# Patient Record
Sex: MALE | Race: BLACK OR AFRICAN AMERICAN | NOT HISPANIC OR LATINO | Employment: FULL TIME | URBAN - METROPOLITAN AREA
[De-identification: names, ages, dates, MRNs, and addresses within clinical notes are randomized per-mention and may not be internally consistent; named-entity substitution may affect disease eponyms.]

---

## 2024-03-06 ENCOUNTER — APPOINTMENT (OUTPATIENT)
Dept: GENERAL RADIOLOGY | Facility: HOSPITAL | Age: 44
End: 2024-03-06
Payer: OTHER MISCELLANEOUS

## 2024-03-06 ENCOUNTER — HOSPITAL ENCOUNTER (EMERGENCY)
Facility: HOSPITAL | Age: 44
Discharge: HOME | End: 2024-03-06
Attending: EMERGENCY MEDICINE | Admitting: EMERGENCY MEDICINE
Payer: OTHER MISCELLANEOUS

## 2024-03-06 VITALS
TEMPERATURE: 97.4 F | RESPIRATION RATE: 18 BRPM | DIASTOLIC BLOOD PRESSURE: 108 MMHG | OXYGEN SATURATION: 97 % | SYSTOLIC BLOOD PRESSURE: 149 MMHG | BODY MASS INDEX: 26.55 KG/M2 | HEIGHT: 72 IN | HEART RATE: 53 BPM | WEIGHT: 196 LBS

## 2024-03-06 DIAGNOSIS — S29.012A STRAIN OF THORACIC BACK REGION: Primary | ICD-10-CM

## 2024-03-06 DIAGNOSIS — S46.912A STRAIN OF LEFT SHOULDER, INITIAL ENCOUNTER: ICD-10-CM

## 2024-03-06 PROCEDURE — 25010000002 KETOROLAC TROMETHAMINE PER 15 MG: Performed by: EMERGENCY MEDICINE

## 2024-03-06 PROCEDURE — 96372 THER/PROPH/DIAG INJ SC/IM: CPT

## 2024-03-06 PROCEDURE — 73030 X-RAY EXAM OF SHOULDER: CPT

## 2024-03-06 PROCEDURE — 99282 EMERGENCY DEPT VISIT SF MDM: CPT

## 2024-03-06 RX ORDER — LIDOCAINE 50 MG/G
1 PATCH TOPICAL EVERY 24 HOURS
Qty: 15 EACH | Refills: 0 | Status: SHIPPED | OUTPATIENT
Start: 2024-03-06

## 2024-03-06 RX ORDER — IBUPROFEN 800 MG/1
800 TABLET ORAL EVERY 8 HOURS PRN
Qty: 20 TABLET | Refills: 0 | Status: SHIPPED | OUTPATIENT
Start: 2024-03-06

## 2024-03-06 RX ORDER — KETOROLAC TROMETHAMINE 30 MG/ML
30 INJECTION, SOLUTION INTRAMUSCULAR; INTRAVENOUS ONCE
Status: COMPLETED | OUTPATIENT
Start: 2024-03-06 | End: 2024-03-06

## 2024-03-06 RX ADMIN — KETOROLAC TROMETHAMINE 30 MG: 30 INJECTION, SOLUTION INTRAMUSCULAR; INTRAVENOUS at 22:15

## 2024-03-06 NOTE — Clinical Note
[facility] EMERGENCY DEPARTMENT  [address] [facility] [address]  Phone: [phone]    Mikhail Yeh was seen and treated in our emergency department on 3/6/2024.  He may return to work on 03/07/2024.  Return evening shift of 3/7/2024       Thank you for choosing [facility].    Dayanara Villegas MD

## 2024-03-06 NOTE — Clinical Note
[facility] EMERGENCY DEPARTMENT  [address] [facility] [address]  Phone: [phone]    Mikhail Yeh was seen and treated in our emergency department on 3/6/2024.  He may return to work on 03/08/2024.         Thank you for choosing [facility].    Dayanara Villegas MD

## 2024-03-07 NOTE — ED PROVIDER NOTES
" EMERGENCY DEPARTMENT ENCOUNTER    Room Number:  18/18  PCP: Provider, No Known  Independent Historians: Patient    HPI:  Chief Complaint: had concerns including Back Pain.      A complete HPI/ROS/PMH/PSH/SH/FH are unobtainable due to: None    Chronic or social conditions impacting patient care (Social Determinants of Health): None      Context: Mikhail Yeh is a 43 y.o. male with a medical history of \"back issues who presents to the ED c/o acute left upper back pain.  Patient works night shift at [employer].  He has a floater symptoms different positions.  Monday night he did a job lifting and pulling for the entire shift that aggravated left upper back and left posterior shoulder and shoulder blade.  Since then he has had trouble sleeping due to pain and felt so tight today that he could not go to work.  Patient has never had any prior procedures or surgeries on the left shoulder.  He denies any specific injury, fall, or popping or snapping sensation.        Review of prior external notes (non-ED) -and- Review of prior external test results outside of this encounter: Patient was seen for headache and fatigue in office with primary care provider in May 2023.  Patient had some basic labs done at that time including a BMP that was normal.    Prescription drug monitoring program review:     N/A    PAST MEDICAL HISTORY  Active Ambulatory Problems     Diagnosis Date Noted    No Active Ambulatory Problems     Resolved Ambulatory Problems     Diagnosis Date Noted    No Resolved Ambulatory Problems     No Additional Past Medical History         PAST SURGICAL HISTORY  History reviewed. No pertinent surgical history.      FAMILY HISTORY  History reviewed. No pertinent family history.      SOCIAL HISTORY  Social History     Socioeconomic History    Marital status: Single   Tobacco Use    Smoking status: Every Day     Types: Cigarettes   Vaping Use    Vaping status: Never Used   Substance and Sexual Activity    Alcohol use: Yes    " Drug use: Yes     Types: Marijuana    Sexual activity: Yes         ALLERGIES  Patient has no known allergies.        REVIEW OF SYSTEMS  Review of Systems  Included in HPI  All systems reviewed and negative except for those discussed in HPI.      PHYSICAL EXAM    I have reviewed the triage vital signs and nursing notes.    ED Triage Vitals   Temp Heart Rate Resp BP SpO2   03/06/24 2127 03/06/24 2127 03/06/24 2127 03/06/24 2131 03/06/24 2127   97.4 °F (36.3 °C) 67 18 143/91 97 %      Temp src Heart Rate Source Patient Position BP Location FiO2 (%)   03/06/24 2127 03/06/24 2127 -- -- --   Tympanic Monitor          Physical Exam  GENERAL: Pleasant cooperative and conversant male, well-appearing, normal and steady gait, alert, no acute distress  SKIN: Warm, dry  HENT: Normocephalic, atraumatic  Neck: No spinous tenderness to palpation, tenderness to palpation over left trapezius muscle and left upper thoracic back without spinous tenderness to palpation  RESPIRATORY: Relaxed breathing  MUSCULOSKELETAL: no deformity, pain with range of motion at left shoulder with pain over left trapezius muscle with palpable spasm  NEURO: alert, moves all extremities, follows commands, equal  strengths, equal sensation to light touch to both upper extremities                                                                 RADIOLOGY  XR Shoulder 2+ View Left    Result Date: 3/6/2024  2 VIEWS LEFT SHOULDER  HISTORY: Pain to the left shoulder and back  COMPARISON: None available.  FINDINGS: No acute fracture or subluxation of the left shoulder is seen. There are degenerative changes involving the left AC joint.      No acute osseous abnormalities.  This report was finalized on 3/6/2024 11:07 PM by Dr. Beth Naik M.D on Workstation: [id number]         MEDICATIONS GIVEN IN ER  Medications   ketorolac (TORADOL) injection 30 mg (30 mg Intramuscular Given 3/6/24 1662)         ORDERS PLACED DURING THIS VISIT:  Orders Placed This  Encounter   Procedures    XR Shoulder 2+ View Left         OUTPATIENT MEDICATION MANAGEMENT:  No current Epic-ordered facility-administered medications on file.     Current Outpatient Medications Ordered in Epic   Medication Sig Dispense Refill    ibuprofen (ADVIL,MOTRIN) 800 MG tablet Take 1 tablet by mouth Every 8 (Eight) Hours As Needed for Moderate Pain. 20 tablet 0    lidocaine (LIDODERM) 5 % Place 1 patch on the skin as directed by provider Daily. Remove & Discard patch within 12 hours or as directed by MD 15 each 0         PROCEDURES  Procedures            PROGRESS, DATA ANALYSIS, CONSULTS, AND MEDICAL DECISION MAKING  All labs have been independently interpreted by me.  All radiology studies have been reviewed by me. All EKG's have been independently viewed and interpreted by me.  Discussion below represents my analysis of pertinent findings related to patient's condition, differential diagnosis, treatment plan and final disposition.    Differential diagnosis includes but is not limited to   Patient presenting with an exam and history consistent with left trapezius muscle strain.  However, given patient's ongoing issues he could have rotator cuff pathology, AC joint separation, calcific tendinitis, or a number of other issues.  Plan screening x-ray of left shoulder.  Will give IM Toradol and reassess    Clinical Scores:                  ED Course as of 03/07/24 0303   Wed Mar 06, 2024   2221 I viewed patient's left shoulder x-ray and on my interpretation patient has no obvious malalignment nor fracture [AR]      ED Course User Index  [AR] Dayanara Villegas MD             AS OF 03:03 EST VITALS:    BP - (!) 149/108  HR - 53  TEMP - 97.4 °F (36.3 °C) (Tympanic)  O2 SATS - 97%      COMPLEXITY OF CARE  Admission was considered but after careful review of the patient's presentation, physical examination, diagnostic results, and response to treatment the patient may be safely discharged with outpatient  follow-up.    DIAGNOSIS  Final diagnoses:   Strain of thoracic back region   Strain of left shoulder, initial encounter         DISPOSITION  ED Disposition       ED Disposition   Discharge    Condition   Stable    Comment   --                Please note that portions of this document were completed with a voice recognition program.    Note Disclaimer: At [facility], we believe that sharing information builds trust and better relationships. You are receiving this note because you recently visited [facility]. It is possible you will see health information before a provider has talked with you about it. This kind of information can be easy to misunderstand. To help you fully understand what it means for your health, we urge you to discuss this note with your provider.         Dayanara Villegas MD  03/08/24 0444

## 2024-03-07 NOTE — DISCHARGE INSTRUCTIONS
Rest at home avoiding any particularly strenuous activity today.     Eat small, frequent meals and drink plenty of fluids. Take any medication prescribed as instructed.     Monitor for any signs of recurrent symptoms or worsening and see your primary doctor to discuss your ER visit while returning to the ER if any concerns as we discussed.